# Patient Record
Sex: FEMALE | Race: WHITE
[De-identification: names, ages, dates, MRNs, and addresses within clinical notes are randomized per-mention and may not be internally consistent; named-entity substitution may affect disease eponyms.]

---

## 2024-01-23 PROBLEM — Z00.00 ENCOUNTER FOR PREVENTIVE HEALTH EXAMINATION: Status: ACTIVE | Noted: 2024-01-23

## 2024-01-26 ENCOUNTER — TRANSCRIPTION ENCOUNTER (OUTPATIENT)
Age: 39
End: 2024-01-26

## 2024-01-26 ENCOUNTER — APPOINTMENT (OUTPATIENT)
Dept: HEART AND VASCULAR | Facility: CLINIC | Age: 39
End: 2024-01-26
Payer: COMMERCIAL

## 2024-01-26 ENCOUNTER — NON-APPOINTMENT (OUTPATIENT)
Age: 39
End: 2024-01-26

## 2024-01-26 VITALS
SYSTOLIC BLOOD PRESSURE: 110 MMHG | DIASTOLIC BLOOD PRESSURE: 60 MMHG | HEIGHT: 62 IN | BODY MASS INDEX: 26.68 KG/M2 | HEART RATE: 87 BPM | WEIGHT: 145 LBS | OXYGEN SATURATION: 98 %

## 2024-01-26 PROCEDURE — 99203 OFFICE O/P NEW LOW 30 MIN: CPT | Mod: 25

## 2024-01-26 PROCEDURE — 93000 ELECTROCARDIOGRAM COMPLETE: CPT

## 2024-02-02 RX ORDER — METOPROLOL SUCCINATE 25 MG/1
25 TABLET, EXTENDED RELEASE ORAL
Refills: 0 | Status: ACTIVE | COMMUNITY

## 2024-02-02 RX ORDER — OMEPRAZOLE MAGNESIUM 40 MG/1
40 CAPSULE, DELAYED RELEASE ORAL
Refills: 0 | Status: ACTIVE | COMMUNITY

## 2024-02-02 RX ORDER — MIDODRINE HYDROCHLORIDE 2.5 MG/1
2.5 TABLET ORAL
Refills: 0 | Status: ACTIVE | COMMUNITY

## 2024-02-02 NOTE — ADDENDUM
[FreeTextEntry1] : I, Sondra Kumar, am scribing for and the presence of Dr. Deng the following sections: HPI, PMH,Family/social history, ROS, Physical Exam, Assessment / Plan.  I, Alan Deng, personally performed the services described in the documentation, reviewed the documentation recorded by the scribe in my presence and it accurately and completely records my words and actions.

## 2024-02-02 NOTE — DISCUSSION/SUMMARY
[EKG obtained to assist in diagnosis and management of assessed problem(s)] : EKG obtained to assist in diagnosis and management of assessed problem(s) [FreeTextEntry1] : 37yo F, multiple prior surgeries for ulnar nerve entrapment, prior vasovagal syncope who is referred for dysautonomia.  She had frequent episodes of palpitations, dizziness, near syncope.  Currently being maintained on Metoprolol and Midodrine.  Near syncope seems neurogenic in nature.  We reviewed the pathophysiology of dysautonomia.  We advised nonpharmacological ways to prevent this including adequate hydration, compressions stockings, increasing salt intake, and increasing exercise tolerance.  We will reassess symptoms after this.  Follow up in 3months.

## 2024-02-02 NOTE — REVIEW OF SYSTEMS
[Feeling Fatigued] : feeling fatigued [Palpitations] : palpitations [Syncope] : syncope [Dizziness] : dizziness [Weakness] : weakness [Anxiety] : anxiety [Negative] : Heme/Lymph [Fever] : no fever [Chills] : no chills [Blurry Vision] : no blurred vision [SOB] : no shortness of breath [Dyspnea on exertion] : not dyspnea during exertion [Chest Discomfort] : no chest discomfort [Lower Ext Edema] : no extremity edema [Leg Claudication] : no intermittent leg claudication [Orthopnea] : no orthopnea [PND] : no PND [Cough] : no cough

## 2024-02-02 NOTE — PHYSICAL EXAM
[Well Developed] : well developed [No Acute Distress] : no acute distress [Normal Venous Pressure] : normal venous pressure [Normal S1, S2] : normal S1, S2 [No Murmur] : no murmur [Clear Lung Fields] : clear lung fields [No Respiratory Distress] : no respiratory distress  [Soft] : abdomen soft [Non Tender] : non-tender [No Edema] : no edema [No Rash] : no rash [No Skin Lesions] : no skin lesions [Moves all extremities] : moves all extremities [No Focal Deficits] : no focal deficits [Alert and Oriented] : alert and oriented

## 2024-02-02 NOTE — HISTORY OF PRESENT ILLNESS
[FreeTextEntry1] : 39yo F, multiple prior surgeries for ulnar nerve entrapment, prior vasovagal syncope who is being referred by her cardiologist Dr. Everett for POTS/dysautonomia.  Patient reports her episodes becoming worse about 6mo ago.  She had gotten sick over the summer and took mucinex and noted palpitations w/ a very high HR and low BP and subsequent near syncope w associated nausea/sweating.  She had gone to the ED and received hydration but otherwise work up was unremakable.  These episodes were becoming more frequent after she had a Covid infection in the fall.  She had performed an event monitor which was reportedly normal showing sinus tachycardia and was referred to see Dr. Everett.  She has since been started on BB w/ some improvement of her palpitations and elevated HR.  She has also been started on midodrine for low blood pressure.  She will intermittently have good days but still suffers from fatigue, dizziness and near syncope.

## 2024-03-11 ENCOUNTER — TRANSCRIPTION ENCOUNTER (OUTPATIENT)
Age: 39
End: 2024-03-11

## 2024-04-26 ENCOUNTER — NON-APPOINTMENT (OUTPATIENT)
Age: 39
End: 2024-04-26

## 2024-04-26 ENCOUNTER — APPOINTMENT (OUTPATIENT)
Dept: HEART AND VASCULAR | Facility: CLINIC | Age: 39
End: 2024-04-26
Payer: COMMERCIAL

## 2024-04-26 VITALS
BODY MASS INDEX: 25.76 KG/M2 | SYSTOLIC BLOOD PRESSURE: 110 MMHG | HEART RATE: 88 BPM | OXYGEN SATURATION: 97 % | WEIGHT: 140 LBS | TEMPERATURE: 98.7 F | DIASTOLIC BLOOD PRESSURE: 70 MMHG | HEIGHT: 62 IN

## 2024-04-26 DIAGNOSIS — R55 SYNCOPE AND COLLAPSE: ICD-10-CM

## 2024-04-26 DIAGNOSIS — G90.1 FAMILIAL DYSAUTONOMIA [RILEY-DAY]: ICD-10-CM

## 2024-04-26 PROCEDURE — 99213 OFFICE O/P EST LOW 20 MIN: CPT | Mod: 25

## 2024-04-26 PROCEDURE — 93000 ELECTROCARDIOGRAM COMPLETE: CPT

## 2024-05-03 PROBLEM — R55 VASOVAGAL NEAR SYNCOPE: Status: ACTIVE | Noted: 2024-02-02

## 2024-05-03 PROBLEM — G90.1 DYSAUTONOMIA: Status: ACTIVE | Noted: 2024-02-02

## 2024-05-03 NOTE — REVIEW OF SYSTEMS
[Fever] : no fever [Chills] : no chills [Feeling Fatigued] : feeling fatigued [Blurry Vision] : no blurred vision [SOB] : no shortness of breath [Dyspnea on exertion] : not dyspnea during exertion [Chest Discomfort] : no chest discomfort [Lower Ext Edema] : no extremity edema [Leg Claudication] : no intermittent leg claudication [Palpitations] : palpitations [Orthopnea] : no orthopnea [PND] : no PND [Syncope] : syncope [Cough] : no cough [Dizziness] : dizziness [Weakness] : weakness [Anxiety] : anxiety [Negative] : Heme/Lymph

## 2024-05-03 NOTE — DISCUSSION/SUMMARY
[FreeTextEntry1] : 38yo F, multiple prior surgeries for ulnar nerve entrapment, prior vasovagal syncope who is referred for dysautonomia.  She had frequent episodes of palpitations, dizziness, near syncope.  Currently being maintained on Metoprolol and Midodrine.  Near syncope seems neurogenic in nature.  We reviewed the pathophysiology of dysautonomia.  We again advised nonpharmacological ways to prevent this including adequate hydration, compressions stockings, increasing salt intake, and increasing exercise tolerance.  She has improvement with these methods, and we discussed this will likely continue to improve over time.  We will reassess symptoms after this.  Follow up in 3months.    [EKG obtained to assist in diagnosis and management of assessed problem(s)] : EKG obtained to assist in diagnosis and management of assessed problem(s)

## 2024-05-03 NOTE — HISTORY OF PRESENT ILLNESS
[FreeTextEntry1] : 40yo F, multiple prior surgeries for ulnar nerve entrapment, prior vasovagal syncope who is being referred by her cardiologist Dr. Everett for POTS/dysautonomia.  Patient reports her episodes becoming worse about 6mo ago.  She had gotten sick over the summer and took mucinex and noted palpitations w/ a very high HR and low BP and subsequent near syncope w associated nausea/sweating.  She had gone to the ED and received hydration but otherwise work up was unremakable.  These episodes were becoming more frequent after she had a Covid infection in the fall.  She had performed an event monitor which was reportedly normal showing sinus tachycardia and was referred to see Dr. Everett.  She has since been started on BB w/ some improvement of her palpitations and elevated HR.  She has also been started on midodrine for low blood pressure.  She will intermittently have good days but still suffers from fatigue, dizziness and near syncope.    She returns for follow up.  She has increased her hydration and has been consistent with wearing compression stockings.  She is attempting to exercise more frequently although restricted at times secondary to work.  She has improvement of her symptoms and reports more good days.  She still suffers intermittent dizziness and fatigue.  She has chronic issues w/ sleeping.  She has a stable exercise tolerance when she is able to work out (cycling or skiing for several hours).

## 2024-08-05 ENCOUNTER — APPOINTMENT (OUTPATIENT)
Dept: HEART AND VASCULAR | Facility: CLINIC | Age: 39
End: 2024-08-05

## 2024-08-05 ENCOUNTER — NON-APPOINTMENT (OUTPATIENT)
Age: 39
End: 2024-08-05

## 2024-08-05 PROCEDURE — 93000 ELECTROCARDIOGRAM COMPLETE: CPT

## 2024-08-05 PROCEDURE — 99213 OFFICE O/P EST LOW 20 MIN: CPT

## 2024-08-05 PROCEDURE — G2211 COMPLEX E/M VISIT ADD ON: CPT

## 2024-08-12 NOTE — HISTORY OF PRESENT ILLNESS
[FreeTextEntry1] : 38yo F, multiple prior surgeries for ulnar nerve entrapment, prior vasovagal syncope who is being referred by her cardiologist Dr. Everett for POTS/dysautonomia.  Patient reports her episodes becoming worse about 6mo ago.  She had gotten sick over the summer and took mucinex and noted palpitations w/ a very high HR and low BP and subsequent near syncope w associated nausea/sweating.  She had gone to the ED and received hydration but otherwise work up was unremarkable.  These episodes were becoming more frequent after she had a Covid infection in the fall.  She had performed an event monitor which was reportedly normal showing sinus tachycardia and was referred to see Dr. Everett.  She has since been started on BB w/ some improvement of her palpitations and elevated HR.  She has also been started on midodrine for low blood pressure.  She will intermittently have good days but still suffers from fatigue, dizziness and near syncope.    She returns for follow up.  She has increased her hydration and has been consistent with wearing compression stockings.  She is attempting to exercise more frequently although restricted at times secondary to work.  She has improvement of her symptoms and reports more good days.  She still suffers intermittent dizziness and fatigue.  She has chronic issues w/ sleeping.  She has a stable exercise tolerance when she is able to work out (cycling or skiing for several hours).    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  Went hiking and HR went above 160 and she felt poorly.   Joined gym and has been biking, hiking, taking aerobic classes five days per week.    Drinks 100 ounces Water, Propel, Iced Tea.  Starts day with protein shake.

## 2024-08-12 NOTE — HISTORY OF PRESENT ILLNESS
[FreeTextEntry1] : 38yo F, multiple prior surgeries for ulnar nerve entrapment, prior vasovagal syncope who is being referred by her cardiologist Dr. Everett for POTS/dysautonomia.  Patient reports her episodes becoming worse about 6mo ago.  She had gotten sick over the summer and took mucinex and noted palpitations w/ a very high HR and low BP and subsequent near syncope w associated nausea/sweating.  She had gone to the ED and received hydration but otherwise work up was unremarkable.  These episodes were becoming more frequent after she had a Covid infection in the fall.  She had performed an event monitor which was reportedly normal showing sinus tachycardia and was referred to see Dr. Everett.  She has since been started on BB w/ some improvement of her palpitations and elevated HR.  She has also been started on midodrine for low blood pressure.  She will intermittently have good days but still suffers from fatigue, dizziness and near syncope.    She returns for follow up.  She has increased her hydration and has been consistent with wearing compression stockings.  She is attempting to exercise more frequently although restricted at times secondary to work.  She has improvement of her symptoms and reports more good days.  She still suffers intermittent dizziness and fatigue.  She has chronic issues w/ sleeping.  She has a stable exercise tolerance when she is able to work out (cycling or skiing for several hours).    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  Went hiking and HR went above 160 and she felt poorly.   Joined gym and has been biking, hiking, taking aerobic classes five days per week.    Drinks 100 ounces Water, Propel, Iced Tea.  Starts day with protein shake. [place]

## 2024-08-12 NOTE — DISCUSSION/SUMMARY
[FreeTextEntry1] : 38yo F, multiple prior surgeries for ulnar nerve entrapment, prior vasovagal syncope who is referred for dysautonomia.  She had frequent episodes of palpitations, dizziness, near syncope.  Near syncope most consistent with [id number] etiology. Symptoms are improved with Metoprolol and Midodrine.    We reviewed the pathophysiology of dysautonomia.  Reviewed nonpharmacological ways to prevent this including adequate hydration, compressions stockings, increasing salt intake, and increasing exercise tolerance.  Encouraged her to continue lifestyle modifications as she has been doing.  Recommend follow up in six months or sooner as needed.

## 2024-08-12 NOTE — ADDENDUM
[FreeTextEntry1] : I, Janice Park, am scribing for and the presence of Dr. Deng the following sections: HPI, PMH,Family/social history, ROS, Physical Exam, Assessment / Plan.   I, Alan Deng, personally performed the services described in the documentation, reviewed the documentation recorded by the scribe in my presence and it accurately and completely records my words and actions.

## 2024-08-12 NOTE — DISCUSSION/SUMMARY
[FreeTextEntry1] : 40yo F, multiple prior surgeries for ulnar nerve entrapment, prior vasovagal syncope who is referred for dysautonomia.  She had frequent episodes of palpitations, dizziness, near syncope.  Near syncope most consistent with [id number] etiology. Symptoms are improved with Metoprolol and Midodrine.    We reviewed the pathophysiology of dysautonomia.  Reviewed nonpharmacological ways to prevent this including adequate hydration, compressions stockings, increasing salt intake, and increasing exercise tolerance.  Encouraged her to continue lifestyle modifications as she has been doing.  Recommend follow up in six months or sooner as needed.

## 2024-09-03 ENCOUNTER — TRANSCRIPTION ENCOUNTER (OUTPATIENT)
Age: 39
End: 2024-09-03

## 2024-09-10 ENCOUNTER — TRANSCRIPTION ENCOUNTER (OUTPATIENT)
Age: 39
End: 2024-09-10

## 2024-11-11 ENCOUNTER — NON-APPOINTMENT (OUTPATIENT)
Age: 39
End: 2024-11-11

## 2024-11-11 ENCOUNTER — APPOINTMENT (OUTPATIENT)
Dept: HEART AND VASCULAR | Facility: CLINIC | Age: 39
End: 2024-11-11
Payer: COMMERCIAL

## 2024-11-11 VITALS
HEIGHT: 62 IN | BODY MASS INDEX: 25.76 KG/M2 | HEART RATE: 85 BPM | DIASTOLIC BLOOD PRESSURE: 60 MMHG | SYSTOLIC BLOOD PRESSURE: 125 MMHG | OXYGEN SATURATION: 99 % | WEIGHT: 140 LBS | TEMPERATURE: 97.9 F

## 2024-11-11 PROCEDURE — G2211 COMPLEX E/M VISIT ADD ON: CPT | Mod: NC

## 2024-11-11 PROCEDURE — 93000 ELECTROCARDIOGRAM COMPLETE: CPT

## 2024-11-11 PROCEDURE — 99213 OFFICE O/P EST LOW 20 MIN: CPT

## 2025-02-10 ENCOUNTER — APPOINTMENT (OUTPATIENT)
Dept: HEART AND VASCULAR | Facility: CLINIC | Age: 40
End: 2025-02-10